# Patient Record
Sex: FEMALE | Race: WHITE | Employment: UNEMPLOYED | URBAN - METROPOLITAN AREA
[De-identification: names, ages, dates, MRNs, and addresses within clinical notes are randomized per-mention and may not be internally consistent; named-entity substitution may affect disease eponyms.]

---

## 2017-08-03 ENCOUNTER — APPOINTMENT (OUTPATIENT)
Dept: GENERAL RADIOLOGY | Age: 32
End: 2017-08-03
Attending: EMERGENCY MEDICINE
Payer: SELF-PAY

## 2017-08-03 ENCOUNTER — HOSPITAL ENCOUNTER (EMERGENCY)
Age: 32
Discharge: HOME | End: 2017-08-03
Attending: EMERGENCY MEDICINE
Payer: SELF-PAY

## 2017-08-03 VITALS
TEMPERATURE: 99.2 F | DIASTOLIC BLOOD PRESSURE: 84 MMHG | HEART RATE: 89 BPM | RESPIRATION RATE: 18 BRPM | WEIGHT: 166.01 LBS | OXYGEN SATURATION: 100 % | SYSTOLIC BLOOD PRESSURE: 134 MMHG | HEIGHT: 66 IN | BODY MASS INDEX: 26.68 KG/M2

## 2017-08-03 DIAGNOSIS — M89.9 HUMERUS LESION, LEFT: ICD-10-CM

## 2017-08-03 DIAGNOSIS — M25.522 LEFT ELBOW PAIN: Primary | ICD-10-CM

## 2017-08-03 PROCEDURE — A4565 SLINGS: HCPCS

## 2017-08-03 PROCEDURE — 99283 EMERGENCY DEPT VISIT LOW MDM: CPT

## 2017-08-03 PROCEDURE — 73080 X-RAY EXAM OF ELBOW: CPT

## 2017-08-03 RX ORDER — IBUPROFEN 600 MG/1
600 TABLET ORAL
Qty: 20 TAB | Refills: 0 | Status: SHIPPED | OUTPATIENT
Start: 2017-08-03 | End: 2017-08-10

## 2017-08-03 RX ORDER — IBUPROFEN 200 MG
800 TABLET ORAL
COMMUNITY
End: 2017-08-03

## 2017-08-03 NOTE — DISCHARGE INSTRUCTIONS
We hope that we have addressed all of your medical concerns. The examination and treatment you received in the Emergency Department were for an emergent problem and were not intended as complete care. It is important that you follow up with your healthcare provider(s) for ongoing care. If your symptoms worsen or do not improve as expected, and you are unable to reach your usual health care provider(s), you should return to the Emergency Department. Today's healthcare is undergoing tremendous change, and patient satisfaction surveys are one of the many tools to assess the quality of medical care. You may receive a survey from the [employer] regarding your experience in the Emergency Department. I hope that your experience has been completely positive, particularly the medical care that I provided. As such, please participate in the survey; anything less than excellent does not meet my expectations or intentions. [facility] and [address] [place] [address] participate in nationally recognized quality of care measures. If your blood pressure is greater than 120/80, as reported below, we urge that you seek medical care to address the potential of high blood pressure, commonly known as hypertension. Hypertension can be hereditary or can be caused by certain medical conditions, pain, stress, or \"white coat syndrome. \"       Please make an appointment with your health care provider(s) for follow up of your Emergency Department visit. VITALS:   Patient Vitals for the past 8 hrs:   Temp Pulse Resp BP SpO2   08/03/17 1713 99.2 °F (37.3 °C) 89 18 134/84 100 %          Thank you for allowing us to provide you with medical care today. We realize that you have many choices for your emergency care needs. Please choose us in the future for any continued health care needs. Abdirahman Kimble, [facility]. Office: [phone]            No results found for this or any previous visit (from the past 24 hour(s)). Xr Elbow Lt Min 3 V    Result Date: 8/3/2017  EXAM:  XR ELBOW LT MIN 3 V INDICATION:   pain. COMPARISON: None. FINDINGS: Three views of the left elbow. No acute fracture or dislocation. No significant elbow joint effusion. No significant degenerative change in the elbow joint. There is a supracondylar process extending anteriorly from the distal humeral diaphysis. IMPRESSION: No acute abnormality. There is a supracondylar process of the humerus which can sometimes be associated with compression of the brachial artery or the median nerve. Elbow: Exercises  Your Care Instructions  Here are some examples of exercises for elbows. Start each exercise slowly. Ease off the exercise if you start to have pain. Your doctor or physical therapist will tell you when you can start these exercises and which ones will work best for you. How to do the exercises  Wrist flexor stretch    1. Extend your arm in front of you with your palm up. 2. Bend your wrist, pointing your hand toward the floor. 3. With your other hand, gently bend your wrist farther until you feel a mild to moderate stretch in your forearm. 4. Hold for at least 15 to 30 seconds. Repeat 2 to 4 times. Wrist extensor stretch    Repeat steps 1 to 4 of the stretch above but begin with your extended hand palm down. Ball or sock squeeze    1. Hold a tennis ball (or a rolled-up sock) in your hand. 2. Make a fist around the ball (or sock) and squeeze. 3. Hold for about 6 seconds, and then relax for up to 10 seconds. 4. Repeat 8 to 12 times. 5. Switch the ball (or sock) to your other hand and do 8 to 12 times. Wrist deviation    1. Sit so that your arm is supported but your hand hangs off the edge of a flat surface, such as a table. 2. Hold your hand out like you are shaking hands with someone.   3. Move your hand up and down.  4. Repeat this motion 8 to 12 times. 5. Switch arms. 6. Try to do this exercise twice with each hand. Wrist curls    1. Place your forearm on a table with your hand hanging over the edge of the table, palm up. 2. Place a 1- to 2-pound weight in your hand. This may be a dumbbell, a can of food, or a filled water bottle. 3. Slowly raise and lower the weight while keeping your forearm on the table and your palm facing up. 4. Repeat this motion 8 to 12 times. 5. Switch arms, and do steps 1 through 4.  6. Repeat with your hand facing down toward the floor. Switch arms. Biceps curls    1. Sit leaning forward with your legs slightly spread and your left hand on your left thigh. 2. Place your right elbow on your right thigh, and hold the weight with your forearm horizontal.  3. Slowly curl the weight up and toward your chest.  4. Repeat this motion 8 to 12 times. 5. Switch arms, and do steps 1 through 4. Follow-up care is a key part of your treatment and safety. Be sure to make and go to all appointments, and call your doctor if you are having problems. It's also a good idea to know your test results and keep a list of the medicines you take. Where can you learn more? Go to http://sara-yosvany.info/. Enter M279 in the search box to learn more about \"Elbow: Exercises. \"  Current as of: March 21, 2017  Content Version: 11.3  © 5830-8747 [employer]. Care instructions adapted under license by [facility] (which disclaims liability or warranty for this information). If you have questions about a medical condition or this instruction, always ask your healthcare professional. [address] any warranty or liability for your use of this information. Learning About RICE (Rest, Ice, Compression, and Elevation)  What is RICE? RICE is a way to care for an injury. RICE helps relieve pain and swelling.  It may also help with healing and flexibility. RICE stands for:  · Rest and protect the injured or sore area. · Ice or a cold pack used as soon as possible. · Compression, or wrapping the injured or sore area with an elastic bandage. · Elevation (propping up) the injured or sore area. How do you do RICE? You can use RICE for home treatment when you have general aches and pains or after an injury or surgery. Rest  · Do not put weight on the injury for at least 24 to 48 hours. · Use crutches for a badly sprained knee or ankle. · Support a sprained wrist, elbow, or shoulder with a sling. Ice  · Put ice or a cold pack on the injury right away to reduce pain and swelling. Frozen vegetables will also work as an ice pack. Put a thin cloth between the ice or cold pack and your skin. The cloth protects the injured area from getting too cold. · Use ice for 10 to 15 minutes at a time for the first 48 to 72 hours. Compression  · Use compression for sprains, strains, and surgeries of the arms and legs. · Wrap the injured area with an elastic bandage or compression sleeve to reduce swelling. · Don't wrap it too tightly. If the area below it feels numb, tingles, or feels cool, loosen the wrap. Elevation  · Use elevation for areas of the body that can be propped up, such as arms and legs. · Prop up the injured area on pillows whenever you use ice. Keep it propped up anytime you sit or lie down. · Try to keep the injured area at or above the level of your heart. This will help reduce swelling and bruising. Where can you learn more? Go to http://sara-yosvany.info/. Enter X297 in the search box to learn more about \"Learning About RICE (Rest, Ice, Compression, and Elevation). \"  Current as of: March 21, 2017  Content Version: 11.3  © 1807-2878 [employer]. Care instructions adapted under license by [facility] (which disclaims liability or warranty for this information).  If you have questions about a medical condition or this instruction, always ask your healthcare professional. [place] [address] any warranty or liability for your use of this information.

## 2017-08-03 NOTE — ED PROVIDER NOTES
HPI Comments: Luke Whitten is a 28 y.o. female who presents ambulatory to ER with c/o L elbow pain x fall two weeks ago. Pt states that she was leaning up against a railing when it gave out on her and she fell backwards landing on her L elbow. Pt states that she has had left elbow pain and swelling since that time. No other complaints. She specifically denies any fevers, chills, nausea, vomiting, chest pain, shortness of breath, headache, rash, diarrhea, abdominal pain, urinary/bowel changes, sweating or weight loss. PCP: None   PMHx significant for: Past Medical History:  No date: Anemia NEC  No date: Anxiety, generalized  No date: Depression  No date: GERD (gastroesophageal reflux disease)  No date: HX OTHER MEDICAL      Comment: frequent UTI  10/2012: Low back pain with sciatica  10/4/2011: Menorrhagia with regular cycle  No date: Other ill-defined conditions      Comment: anxiety    PSHx significant for: Past Surgical History:  2007: HX  SECTION  No date: HX CHOLECYSTECTOMY  No date: HX HEENT      Comment: wisdom teeth extraction  2007: HX TUBAL LIGATION      -- Latex -- Rash   -- Penicillin G -- Anaphylaxis    There are no other complaints, changes or physical findings at this time. The history is provided by the patient.         Past Medical History:   Diagnosis Date    Anemia NEC     Anxiety, generalized     Depression     GERD (gastroesophageal reflux disease)     HX OTHER MEDICAL     frequent UTI    Low back pain with sciatica 10/2012    Menorrhagia with regular cycle 10/4/2011    Other ill-defined conditions     anxiety       Past Surgical History:   Procedure Laterality Date    HX  SECTION      HX CHOLECYSTECTOMY      HX HEENT      wisdom teeth extraction    HX TUBAL LIGATION           Family History:   Problem Relation Age of Onset    Psychiatric Disorder Mother     Hypertension Father     Diabetes Maternal Grandmother     Diabetes Maternal Grandfather     Diabetes Paternal Grandmother     Diabetes Paternal Grandfather     Cancer Paternal Grandfather     Psychiatric Disorder Maternal Aunt        Social History     Social History    Marital status: SINGLE     Spouse name: N/A    Number of children: N/A    Years of education: N/A     Occupational History    Not on file. Social History Main Topics    Smoking status: Current Every Day Smoker     Packs/day: 1.00     Years: 11.00    Smokeless tobacco: Never Used    Alcohol use Yes      Comment: occasionally    Drug use: No    Sexual activity: Yes     Partners: Male     Other Topics Concern    Not on file     Social History Narrative    ** Merged History Encounter **              ALLERGIES: Latex and Penicillin g    Review of Systems   Constitutional: Negative. HENT: Negative. Eyes: Negative. Respiratory: Negative. Cardiovascular: Negative. Gastrointestinal: Negative. Genitourinary: Negative. Musculoskeletal: Positive for arthralgias (left elbow pain). Skin: Negative. Neurological: Negative. Hematological: Negative. Psychiatric/Behavioral: Negative. All other systems reviewed and are negative. Vitals:    08/03/17 1713   BP: 134/84   Pulse: 89   Resp: 18   Temp: 99.2 °F (37.3 °C)   SpO2: 100%   Weight: 75.3 kg (166 lb 0.1 oz)   Height: 5' 6\" (1.676 m)            Physical Exam   Constitutional: She is oriented to person, place, and time. She appears well-developed and well-nourished. No distress. HENT:   Head: Normocephalic and atraumatic. Neck: Normal range of motion. Cardiovascular: Intact distal pulses and normal pulses. Musculoskeletal: Normal range of motion. She exhibits no edema or tenderness. Left lateral elbow mild tenderness and swelling. No overlying ecchymosis. FROM elbow without difficulty. NVI distally, brisk cap refill, radial pulse 2+. Neurological: She is alert and oriented to person, place, and time. She has normal strength.  No sensory deficit. Skin: Skin is warm and dry. No rash noted. She is not diaphoretic. No erythema. No pallor. Psychiatric: She has a normal mood and affect. Her behavior is normal.   Nursing note and vitals reviewed. MDM  Number of Diagnoses or Management Options  Humerus lesion, left:   Left elbow pain:   Diagnosis management comments: DDx: sprain, strain, contusion, fx       Amount and/or Complexity of Data Reviewed  Tests in the radiology section of CPT®: reviewed and ordered  Discuss the patient with other providers: yes (Dr. Marc Treviño)    Patient Progress  Patient progress: stable    ED Course       Procedures                       5:57 PM   Reinier Rivas PA-C discussed patient with Nyla Lott MD who is in agreement with care plan as outlined. No further recommendations. Reinier Rivas PA-C      5:57 PM  Pt has been reevaluated. There are no new complaints, changes, or physical findings at this time. Medications have been reviewed w/ pt and/or family. Pt and/or family's questions have been answered. Pt and/or family expressed good understanding of the dx/tx/rx and is in agreement with plan of care. Pt instructed and agreed to f/u w/ PCP and to return to ED upon further deterioration. Pt is ready for discharge. LABORATORY TESTS:  No results found for this or any previous visit (from the past 12 hour(s)). IMAGING RESULTS:  XR ELBOW LT MIN 3 V   Final Result        Xr Elbow Lt Min 3 V    Result Date: 8/3/2017  EXAM:  XR ELBOW LT MIN 3 V INDICATION:   pain. COMPARISON: None. FINDINGS: Three views of the left elbow. No acute fracture or dislocation. No significant elbow joint effusion. No significant degenerative change in the elbow joint. There is a supracondylar process extending anteriorly from the distal humeral diaphysis. IMPRESSION: No acute abnormality.   There is a supracondylar process of the humerus which can sometimes be associated with compression of the brachial artery or the median nerve.         MEDICATIONS GIVEN:  Medications - No data to display    IMPRESSION:  1. Left elbow pain    2. Humerus lesion, left        PLAN:  1. Current Discharge Medication List      CONTINUE these medications which have CHANGED    Details   ibuprofen (MOTRIN) 600 mg tablet Take 1 Tab by mouth every eight (8) hours as needed for Pain for up to 7 days. Qty: 20 Tab, Refills: 0         CONTINUE these medications which have NOT CHANGED    Details   ASPIRIN/SALICYLAMIDE/CAFFEINE (BC HEADACHE POWDER PO) Take  by mouth.            2.   Follow-up Information     Follow up With Details Comments Contact Info    Frank Storm MD Schedule an appointment as soon as possible for a visit in 3 days [facility] [address] [address] [phone]      [facility] EMERGENCY DEPT  If symptoms worsen [address]  [phone]            Return to ED if worse

## 2017-08-03 NOTE — ED TRIAGE NOTES
28 yr old female here for left elbow from a fall two weeks ago. States not getting any better. Has full range of motion but painful when moving. Swelling noted. Used a sling but still not getting better.

## 2017-08-03 NOTE — ED NOTES
The patient was discharged home by Frandy King in stable condition. The patient is alert and oriented, in no respiratory distress. The patient's diagnosis, condition and treatment were explained. The patient expressed understanding. A discharge plan has been developed. A  was not involved in the process. Aftercare instructions were given. Pt ambulatory out of the ED.

## 2022-03-12 ENCOUNTER — HOSPITAL ENCOUNTER (EMERGENCY)
Age: 37
Discharge: STILL ADMITTED OR ADMINISTRATIVE | End: 2022-03-12
Attending: EMERGENCY MEDICINE